# Patient Record
Sex: FEMALE | Race: WHITE | NOT HISPANIC OR LATINO | Employment: FULL TIME | URBAN - METROPOLITAN AREA
[De-identification: names, ages, dates, MRNs, and addresses within clinical notes are randomized per-mention and may not be internally consistent; named-entity substitution may affect disease eponyms.]

---

## 2021-12-02 NOTE — PROGRESS NOTES
"Chief Complaint  Consult (panniculectomy)    Subjective               HPI   Mary Del Real is a 46 y.o. female who presents to [facility]LASTIC [facility] RECON[facility] SURGERY as a consult from self  for   Chief Complaint   Patient presents with   • Consult     panniculectomy   .   Patient has had Gastric sleeve converted to  bypass. Starting weight is 398 lbs, current weight is 254 lbs, stable weight.  Patient has rash and moisture under abdominal fold. Treats rash with Nystatin powder and ointment . History of surgeries: gastric sleeve/bypass Patient does not children, and does not want to have children.   Wants to talk about a abdominoplasty  .    Allergies: Patient has no known allergies.  Allergies Reconciled.    Objective     /72 (BP Location: Left arm, Patient Position: Sitting)   Pulse 82   Temp 94.4 °F (34.7 °C) (Temporal)   Ht 165.1 cm (65\")   Wt 115 kg (254 lb)   SpO2 98%   BMI 42.27 kg/m²     Body mass index is 42.27 kg/m².    Physical Exam   • Excess skin above and below umbilicus with grade 5 panniculus. Significant moisture, hyperpigmentation, and irritation over lower abdominal fold, does not have palpable hernia, and no open areas.     • Lipodystrophy of abdomen, flanks and back.     Result Review :   The following data was reviewed by: Sterling Owens MD on 12/03/2021:    Procedures              Diagnoses and all orders for this visit:    1. Abdominal pannus (Primary)        Plan:  • I had a long discussion with the patient regarding her  panniculitis. I have explained to her  that I believe she would benefit from panniculectomy to address her excess hanging skin. I explained that this would involve a low transverse incision and would only address the excess skin below the umbilicus. Patient was told about the possible utilization of an additional vertical incision which is often used to further improve the abdominal contour.She  understands that this is not a cosmetic " body contouring procedure.  Scar position was discussed, including long transverse incision. We discussed recovery time which would include no heavy lifting for 4-6 weeks, drains, and abdominal binder. We discussed risks including but not limited to bleeding, infection, pain, wound healing problems, contour irregularities, blood clot, and anesthetic risk .  • Discussed r/b of liposuction including, bruising, bleeding, infection, numbness, scarring, pain, fat embolism, asymmetries, contour irregularities and anesthetic risk. Client has realistic expectations and would like to proceed.   • The patient would be an excellent candidate for abdominoplasty. Scar position was discussed, including low transverse incision and possibly utilization of a vertical incision to improve contour. Discussed anesthetic risk and risks including, but not limited to wound healing problems, scar, blood clot, fluid collection, infection. Patient understands no heavy lifting or working out for 6 weeks and importance of ambulating post op to prevent a blood clot.  • In case she opts for surgery, she would stay one night in the hospital and go home on the next day with drains and abdominal binder Photographs were obtained today. I will write to the insurance company to obtain insurance coverage for this procedure.    Specifically in her case, I would recommend panniculectomy with horizontal scar only. I explained to patient the risk and benefits as well as the high chance of complications such as blood clots and wound healing problems. I won't address the lateral rows and she will have bilateral dog ears. I will however reposition her umbilicus. I explained she will have a long scar, we will use drains and also abdominal binder. She will have a pre op shot of lovenox to decrease change of blood clot and spy to antecipate areas of fat necorsis.     CPT:   03870 panniculectomy  30231 Use of spy  14000-umbilical transposition   Or time: 3 hours  under general anesthesia - use of spy  lovenox in pre op only  Overnight stay        Follow Up     No follow-ups on file.    Patient was given instructions and counseling regarding her condition. Please see specific information pulled into the AVS if appropriate.     Sterling Owens MD  12/03/2021

## 2021-12-03 ENCOUNTER — OFFICE VISIT (OUTPATIENT)
Dept: PLASTIC SURGERY | Facility: CLINIC | Age: 46
End: 2021-12-03

## 2021-12-03 VITALS
SYSTOLIC BLOOD PRESSURE: 107 MMHG | HEART RATE: 82 BPM | OXYGEN SATURATION: 98 % | HEIGHT: 65 IN | WEIGHT: 254 LBS | DIASTOLIC BLOOD PRESSURE: 72 MMHG | TEMPERATURE: 94.4 F | BODY MASS INDEX: 42.32 KG/M2

## 2021-12-03 DIAGNOSIS — E65 ABDOMINAL PANNUS: Primary | ICD-10-CM

## 2021-12-03 PROCEDURE — 99204 OFFICE O/P NEW MOD 45 MIN: CPT | Performed by: SURGERY

## 2021-12-03 RX ORDER — ASCORBIC ACID 250 MG
250 TABLET ORAL DAILY
COMMUNITY

## 2021-12-03 RX ORDER — CYANOCOBALAMIN 1000 UG/ML
INJECTION, SOLUTION INTRAMUSCULAR; SUBCUTANEOUS
COMMUNITY
End: 2022-05-06

## 2021-12-03 RX ORDER — IBUPROFEN 200 MG
1 CAPSULE ORAL DAILY
COMMUNITY
End: 2022-05-06

## 2021-12-03 RX ORDER — SERTRALINE HYDROCHLORIDE 100 MG/1
TABLET, FILM COATED ORAL
COMMUNITY
Start: 2021-11-19

## 2021-12-03 RX ORDER — PHENDIMETRAZINE TARTRATE 35 MG/1
TABLET ORAL EVERY 8 HOURS SCHEDULED
COMMUNITY
End: 2022-05-06

## 2021-12-03 RX ORDER — OMEPRAZOLE 20 MG/1
20 CAPSULE, DELAYED RELEASE ORAL DAILY
COMMUNITY
Start: 2021-11-22 | End: 2022-05-20

## 2021-12-03 RX ORDER — FLUTICASONE PROPIONATE 50 MCG
1 SPRAY, SUSPENSION (ML) NASAL DAILY
COMMUNITY
Start: 2021-11-22

## 2021-12-03 RX ORDER — BIOTIN 10 MG
1 TABLET ORAL DAILY
COMMUNITY

## 2021-12-03 RX ORDER — BUSPIRONE HYDROCHLORIDE 15 MG/1
15 TABLET ORAL DAILY
COMMUNITY
Start: 2021-11-19

## 2021-12-03 RX ORDER — FERROUS SULFATE 325(65) MG
TABLET ORAL
COMMUNITY
Start: 2021-11-19

## 2021-12-03 RX ORDER — NYSTATIN 100000 [USP'U]/G
POWDER TOPICAL
COMMUNITY
Start: 2021-08-31

## 2021-12-08 ENCOUNTER — PREP FOR SURGERY (OUTPATIENT)
Dept: OTHER | Facility: HOSPITAL | Age: 46
End: 2021-12-08

## 2021-12-08 DIAGNOSIS — E65 ABDOMINAL PANNUS: Primary | ICD-10-CM

## 2021-12-08 RX ORDER — CLINDAMYCIN PHOSPHATE 900 MG/50ML
900 INJECTION INTRAVENOUS ONCE
Status: CANCELLED | OUTPATIENT
Start: 2021-12-08 | End: 2021-12-08

## 2022-01-11 DIAGNOSIS — Z01.818 PREOP EXAMINATION: Primary | ICD-10-CM

## 2022-01-28 ENCOUNTER — APPOINTMENT (OUTPATIENT)
Dept: PREADMISSION TESTING | Facility: HOSPITAL | Age: 47
End: 2022-01-28

## 2022-01-31 ENCOUNTER — APPOINTMENT (OUTPATIENT)
Dept: LAB | Facility: HOSPITAL | Age: 47
End: 2022-01-31

## 2022-04-05 ENCOUNTER — TELEPHONE (OUTPATIENT)
Dept: OBSTETRICS AND GYNECOLOGY | Facility: CLINIC | Age: 47
End: 2022-04-05

## 2022-04-05 NOTE — TELEPHONE ENCOUNTER
Patient called stating Plastic Surgeon has a procedure scheduled 5/24/22.  She is also a patient of Dr. Petty who previously discussed a hysterectomy due to an enlarged uterus and a failed ablation.  Patient has heavy menstrual periods and desires definitive treatment.  Patient inquiring if a hysterectomy is possible at the same time as her Plastics procedure.  Will discuss with Physicians available that day and call patient with that information.

## 2022-04-12 ENCOUNTER — TELEPHONE (OUTPATIENT)
Dept: OBSTETRICS AND GYNECOLOGY | Facility: CLINIC | Age: 47
End: 2022-04-12

## 2022-04-12 NOTE — TELEPHONE ENCOUNTER
Called patient and left message.  We will not be able to coordinate her hysterectomy with plastics procedure due to recent changes to schedules since Dr. Cheung currently out of the office for an extended period.

## 2022-05-06 ENCOUNTER — OFFICE VISIT (OUTPATIENT)
Dept: PLASTIC SURGERY | Facility: CLINIC | Age: 47
End: 2022-05-06

## 2022-05-06 VITALS
WEIGHT: 244 LBS | TEMPERATURE: 98.2 F | HEIGHT: 65 IN | OXYGEN SATURATION: 98 % | SYSTOLIC BLOOD PRESSURE: 124 MMHG | BODY MASS INDEX: 40.65 KG/M2 | HEART RATE: 88 BPM | DIASTOLIC BLOOD PRESSURE: 86 MMHG

## 2022-05-06 DIAGNOSIS — E65 ABDOMINAL PANNUS: ICD-10-CM

## 2022-05-06 DIAGNOSIS — M79.3 PANNICULITIS: ICD-10-CM

## 2022-05-06 DIAGNOSIS — Z01.818 PRE-OPERATIVE EXAMINATION: Primary | ICD-10-CM

## 2022-05-06 PROCEDURE — 99213 OFFICE O/P EST LOW 20 MIN: CPT | Performed by: NURSE PRACTITIONER

## 2022-05-06 RX ORDER — CEPHALEXIN 500 MG/1
500 CAPSULE ORAL 4 TIMES DAILY
Qty: 20 CAPSULE | Refills: 0 | Status: SHIPPED | OUTPATIENT
Start: 2022-05-06 | End: 2022-05-11

## 2022-05-06 RX ORDER — PROMETHAZINE HYDROCHLORIDE 25 MG/1
25 TABLET ORAL EVERY 6 HOURS PRN
Qty: 10 TABLET | Refills: 0 | Status: SHIPPED | OUTPATIENT
Start: 2022-05-06 | End: 2022-06-08 | Stop reason: SDUPTHER

## 2022-05-06 RX ORDER — OXYCODONE HYDROCHLORIDE AND ACETAMINOPHEN 5; 325 MG/1; MG/1
1 TABLET ORAL EVERY 6 HOURS PRN
Qty: 28 TABLET | Refills: 0 | Status: SHIPPED | OUTPATIENT
Start: 2022-05-06 | End: 2022-06-01 | Stop reason: SDUPTHER

## 2022-05-06 NOTE — PROGRESS NOTES
"Pre-op Exam (Panniculectomy )            History of Present Illness  Mary Del Real is a 47 y.o. female who presents to [facility] RECON[facility] SURGERY for Pre-Operative Examination for Panniculectomy with umbilical transposition.     She is scheduled for 05/24/2022 @ [facility].     Subjective        Patient has no known allergies.  Allergies Reconciled.    Review of Systems   All review of system has been reviewed and it  is negative except the ones note above.     Objective     /86 (BP Location: Left arm)   Pulse 88   Temp 98.2 °F (36.8 °C) (Temporal)   Ht 165.1 cm (65\")   Wt 111 kg (244 lb)   SpO2 98%   BMI 40.60 kg/m²     Body mass index is 40.6 kg/m².    Physical Exam      Excess skin above and below umbilicus with grade 3- panniculus. Significant moisture, hyperpigmentation, and irritation over lower abdominal fold, does not have palpable hernia, and no open areas.     Result Review :                Assessment and Plan      Diagnoses and all orders for this visit:    1. Pre-operative examination (Primary)  -     oxyCODONE-acetaminophen (Percocet) 5-325 MG per tablet; Take 1 tablet by mouth Every 6 (Six) Hours As Needed for Moderate Pain .  Dispense: 28 tablet; Refill: 0    2. Abdominal pannus  -     oxyCODONE-acetaminophen (Percocet) 5-325 MG per tablet; Take 1 tablet by mouth Every 6 (Six) Hours As Needed for Moderate Pain .  Dispense: 28 tablet; Refill: 0    3. Panniculitis  -     oxyCODONE-acetaminophen (Percocet) 5-325 MG per tablet; Take 1 tablet by mouth Every 6 (Six) Hours As Needed for Moderate Pain .  Dispense: 28 tablet; Refill: 0    Other orders  -     promethazine (PHENERGAN) 25 MG tablet; Take 1 tablet by mouth Every 6 (Six) Hours As Needed for Nausea or Vomiting.  Dispense: 10 tablet; Refill: 0  -     cephalexin (KEFLEX) 500 MG capsule; Take 1 capsule by mouth 4 (Four) Times a Day for 5 days.  Dispense: 20 capsule; Refill: 0        Plan:  • Given these options, " the patient has verbally expressed an understanding of the risks of surgery and finds these risks acceptable. We will proceed with surgery as soon as possible.  • Covid Immunization status: Vaccinated   • Medications sent to pharmacy  • Discussed a nicotine test will be preformed prior to surgery on all surgical patients and if positive, surgery will be canceled. Instructed to avoid second hand smoke and handling any product with nicotine before surgery and after. Nicotine is what causes vasoconstriction of the blood vessels and must be avoided to prevent complictions such as wound healing problems and infection. Patient verbalized understanding.     Follow Up     No follow-ups on file.    Patient was given instructions and counseling regarding her condition. Please see specific information pulled into the AVS if appropriate.     Larissa Valdovinos, SOURAV  05/06/2022

## 2022-05-06 NOTE — H&P (VIEW-ONLY)
"Pre-op Exam (Panniculectomy )            History of Present Illness  Mary Del Real is a 47 y.o. female who presents to [facility]LASTIC [facility] RECONSTRU[facility]IVE SURGERY for Pre-Operative Examination for Panniculectomy with umbilical transposition.     She is scheduled for 05/24/2022 @ [facility].     Subjective        Patient has no known allergies.  Allergies Reconciled.    Review of Systems   All review of system has been reviewed and it  is negative except the ones note above.     Objective     /86 (BP Location: Left arm)   Pulse 88   Temp 98.2 °F (36.8 °C) (Temporal)   Ht 165.1 cm (65\")   Wt 111 kg (244 lb)   SpO2 98%   BMI 40.60 kg/m²     Body mass index is 40.6 kg/m².    Physical Exam      Excess skin above and below umbilicus with grade 3- panniculus. Significant moisture, hyperpigmentation, and irritation over lower abdominal fold, does not have palpable hernia, and no open areas.     Result Review :                Assessment and Plan      Diagnoses and all orders for this visit:    1. Pre-operative examination (Primary)  -     oxyCODONE-acetaminophen (Percocet) 5-325 MG per tablet; Take 1 tablet by mouth Every 6 (Six) Hours As Needed for Moderate Pain .  Dispense: 28 tablet; Refill: 0    2. Abdominal pannus  -     oxyCODONE-acetaminophen (Percocet) 5-325 MG per tablet; Take 1 tablet by mouth Every 6 (Six) Hours As Needed for Moderate Pain .  Dispense: 28 tablet; Refill: 0    3. Panniculitis  -     oxyCODONE-acetaminophen (Percocet) 5-325 MG per tablet; Take 1 tablet by mouth Every 6 (Six) Hours As Needed for Moderate Pain .  Dispense: 28 tablet; Refill: 0    Other orders  -     promethazine (PHENERGAN) 25 MG tablet; Take 1 tablet by mouth Every 6 (Six) Hours As Needed for Nausea or Vomiting.  Dispense: 10 tablet; Refill: 0  -     cephalexin (KEFLEX) 500 MG capsule; Take 1 capsule by mouth 4 (Four) Times a Day for 5 days.  Dispense: 20 capsule; Refill: 0        Plan:  • Given these options, " the patient has verbally expressed an understanding of the risks of surgery and finds these risks acceptable. We will proceed with surgery as soon as possible.  • Covid Immunization status: Vaccinated   • Medications sent to pharmacy  • Discussed a nicotine test will be preformed prior to surgery on all surgical patients and if positive, surgery will be canceled. Instructed to avoid second hand smoke and handling any product with nicotine before surgery and after. Nicotine is what causes vasoconstriction of the blood vessels and must be avoided to prevent complictions such as wound healing problems and infection. Patient verbalized understanding.     Follow Up     No follow-ups on file.    Patient was given instructions and counseling regarding her condition. Please see specific information pulled into the AVS if appropriate.     Larissa Valdovinos, SOURAV  05/06/2022

## 2022-05-24 ENCOUNTER — ANESTHESIA EVENT (OUTPATIENT)
Dept: PERIOP | Facility: HOSPITAL | Age: 47
End: 2022-05-24

## 2022-05-24 ENCOUNTER — ANESTHESIA (OUTPATIENT)
Dept: PERIOP | Facility: HOSPITAL | Age: 47
End: 2022-05-24

## 2022-05-24 ENCOUNTER — HOSPITAL ENCOUNTER (OUTPATIENT)
Facility: HOSPITAL | Age: 47
Setting detail: HOSPITAL OUTPATIENT SURGERY
Discharge: HOME | End: 2022-05-24
Attending: SURGERY | Admitting: SURGERY

## 2022-05-24 VITALS
HEIGHT: 66 IN | WEIGHT: 239.2 LBS | OXYGEN SATURATION: 97 % | TEMPERATURE: 97.5 F | SYSTOLIC BLOOD PRESSURE: 100 MMHG | RESPIRATION RATE: 16 BRPM | DIASTOLIC BLOOD PRESSURE: 57 MMHG | BODY MASS INDEX: 38.44 KG/M2 | HEART RATE: 89 BPM

## 2022-05-24 DIAGNOSIS — D48.1 DESMOID TUMOR: Primary | ICD-10-CM

## 2022-05-24 DIAGNOSIS — D49.59 OVARIAN TUMOR: Primary | ICD-10-CM

## 2022-05-24 DIAGNOSIS — E65 ABDOMINAL PANNUS: ICD-10-CM

## 2022-05-24 DIAGNOSIS — R19.03 RIGHT LOWER QUADRANT ABDOMINAL MASS: Primary | ICD-10-CM

## 2022-05-24 LAB — B-HCG UR QL: NEGATIVE

## 2022-05-24 PROCEDURE — 0 HYDROMORPHONE 1 MG/ML SOLUTION: Performed by: NURSE ANESTHETIST, CERTIFIED REGISTERED

## 2022-05-24 PROCEDURE — 25010000002 ONDANSETRON PER 1 MG: Performed by: NURSE ANESTHETIST, CERTIFIED REGISTERED

## 2022-05-24 PROCEDURE — 81025 URINE PREGNANCY TEST: CPT | Performed by: STUDENT IN AN ORGANIZED HEALTH CARE EDUCATION/TRAINING PROGRAM

## 2022-05-24 PROCEDURE — 15830 EXC EXCESSIVE SKIN ABDOMEN: CPT | Performed by: SURGERY

## 2022-05-24 PROCEDURE — 25010000002 ENOXAPARIN PER 10 MG: Performed by: NURSE PRACTITIONER

## 2022-05-24 PROCEDURE — 25010000002 HYDROMORPHONE 1 MG/ML SOLUTION: Performed by: ANESTHESIOLOGY

## 2022-05-24 PROCEDURE — 25010000002 FENTANYL CITRATE (PF) 50 MCG/ML SOLUTION: Performed by: NURSE ANESTHETIST, CERTIFIED REGISTERED

## 2022-05-24 PROCEDURE — 25010000002 MIDAZOLAM PER 1 MG: Performed by: STUDENT IN AN ORGANIZED HEALTH CARE EDUCATION/TRAINING PROGRAM

## 2022-05-24 PROCEDURE — 93005 ELECTROCARDIOGRAM TRACING: CPT | Performed by: STUDENT IN AN ORGANIZED HEALTH CARE EDUCATION/TRAINING PROGRAM

## 2022-05-24 PROCEDURE — 25010000002 PROPOFOL 10 MG/ML EMULSION: Performed by: NURSE ANESTHETIST, CERTIFIED REGISTERED

## 2022-05-24 PROCEDURE — 25010000002 DEXAMETHASONE PER 1 MG: Performed by: NURSE ANESTHETIST, CERTIFIED REGISTERED

## 2022-05-24 PROCEDURE — 25010000002 ONDANSETRON PER 1 MG: Performed by: ANESTHESIOLOGY

## 2022-05-24 PROCEDURE — 25010000002 PROCHLORPERAZINE 10 MG/2ML SOLUTION: Performed by: ANESTHESIOLOGY

## 2022-05-24 RX ORDER — KETAMINE HYDROCHLORIDE 50 MG/ML
INJECTION, SOLUTION, CONCENTRATE INTRAMUSCULAR; INTRAVENOUS AS NEEDED
Status: DISCONTINUED | OUTPATIENT
Start: 2022-05-24 | End: 2022-05-24 | Stop reason: SURG

## 2022-05-24 RX ORDER — ONDANSETRON 2 MG/ML
4 INJECTION INTRAMUSCULAR; INTRAVENOUS ONCE AS NEEDED
Status: DISCONTINUED | OUTPATIENT
Start: 2022-05-24 | End: 2022-05-24 | Stop reason: HOSPADM

## 2022-05-24 RX ORDER — GLYCOPYRROLATE 0.2 MG/ML
0.2 INJECTION INTRAMUSCULAR; INTRAVENOUS
Status: COMPLETED | OUTPATIENT
Start: 2022-05-24 | End: 2022-05-24

## 2022-05-24 RX ORDER — FENTANYL CITRATE 50 UG/ML
INJECTION, SOLUTION INTRAMUSCULAR; INTRAVENOUS AS NEEDED
Status: DISCONTINUED | OUTPATIENT
Start: 2022-05-24 | End: 2022-05-24 | Stop reason: SURG

## 2022-05-24 RX ORDER — DEXMEDETOMIDINE HYDROCHLORIDE 100 UG/ML
INJECTION, SOLUTION INTRAVENOUS AS NEEDED
Status: DISCONTINUED | OUTPATIENT
Start: 2022-05-24 | End: 2022-05-24 | Stop reason: SURG

## 2022-05-24 RX ORDER — SODIUM CHLORIDE, SODIUM LACTATE, POTASSIUM CHLORIDE, CALCIUM CHLORIDE 600; 310; 30; 20 MG/100ML; MG/100ML; MG/100ML; MG/100ML
9 INJECTION, SOLUTION INTRAVENOUS CONTINUOUS PRN
Status: DISCONTINUED | OUTPATIENT
Start: 2022-05-24 | End: 2022-05-24 | Stop reason: HOSPADM

## 2022-05-24 RX ORDER — ACETAMINOPHEN 500 MG
1000 TABLET ORAL ONCE
Status: COMPLETED | OUTPATIENT
Start: 2022-05-24 | End: 2022-05-24

## 2022-05-24 RX ORDER — DEXAMETHASONE SODIUM PHOSPHATE 4 MG/ML
INJECTION, SOLUTION INTRA-ARTICULAR; INTRALESIONAL; INTRAMUSCULAR; INTRAVENOUS; SOFT TISSUE AS NEEDED
Status: DISCONTINUED | OUTPATIENT
Start: 2022-05-24 | End: 2022-05-24 | Stop reason: SURG

## 2022-05-24 RX ORDER — OXYCODONE HYDROCHLORIDE 5 MG/1
5 TABLET ORAL ONCE AS NEEDED
Status: DISCONTINUED | OUTPATIENT
Start: 2022-05-24 | End: 2022-05-24 | Stop reason: HOSPADM

## 2022-05-24 RX ORDER — MEPERIDINE HYDROCHLORIDE 25 MG/ML
12.5 INJECTION INTRAMUSCULAR; INTRAVENOUS; SUBCUTANEOUS
Status: DISCONTINUED | OUTPATIENT
Start: 2022-05-24 | End: 2022-05-24 | Stop reason: HOSPADM

## 2022-05-24 RX ORDER — PROCHLORPERAZINE EDISYLATE 5 MG/ML
10 INJECTION INTRAMUSCULAR; INTRAVENOUS ONCE
Status: COMPLETED | OUTPATIENT
Start: 2022-05-24 | End: 2022-05-24

## 2022-05-24 RX ORDER — PROPOFOL 10 MG/ML
VIAL (ML) INTRAVENOUS AS NEEDED
Status: DISCONTINUED | OUTPATIENT
Start: 2022-05-24 | End: 2022-05-24 | Stop reason: SURG

## 2022-05-24 RX ORDER — ONDANSETRON 2 MG/ML
INJECTION INTRAMUSCULAR; INTRAVENOUS AS NEEDED
Status: DISCONTINUED | OUTPATIENT
Start: 2022-05-24 | End: 2022-05-24 | Stop reason: SURG

## 2022-05-24 RX ORDER — ROCURONIUM BROMIDE 10 MG/ML
INJECTION, SOLUTION INTRAVENOUS AS NEEDED
Status: DISCONTINUED | OUTPATIENT
Start: 2022-05-24 | End: 2022-05-24 | Stop reason: SURG

## 2022-05-24 RX ORDER — ENOXAPARIN SODIUM 100 MG/ML
40 INJECTION SUBCUTANEOUS
Status: COMPLETED | OUTPATIENT
Start: 2022-05-24 | End: 2022-05-24

## 2022-05-24 RX ORDER — GINSENG 100 MG
CAPSULE ORAL AS NEEDED
Status: DISCONTINUED | OUTPATIENT
Start: 2022-05-24 | End: 2022-05-24 | Stop reason: HOSPADM

## 2022-05-24 RX ORDER — PHENYLEPHRINE HCL IN 0.9% NACL 1 MG/10 ML
SYRINGE (ML) INTRAVENOUS AS NEEDED
Status: DISCONTINUED | OUTPATIENT
Start: 2022-05-24 | End: 2022-05-24 | Stop reason: SURG

## 2022-05-24 RX ORDER — LIDOCAINE HYDROCHLORIDE 20 MG/ML
INJECTION, SOLUTION EPIDURAL; INFILTRATION; INTRACAUDAL; PERINEURAL AS NEEDED
Status: DISCONTINUED | OUTPATIENT
Start: 2022-05-24 | End: 2022-05-24 | Stop reason: SURG

## 2022-05-24 RX ORDER — SCOLOPAMINE TRANSDERMAL SYSTEM 1 MG/1
1 PATCH, EXTENDED RELEASE TRANSDERMAL ONCE
Status: DISCONTINUED | OUTPATIENT
Start: 2022-05-24 | End: 2022-05-24 | Stop reason: HOSPADM

## 2022-05-24 RX ORDER — CLINDAMYCIN PHOSPHATE 900 MG/50ML
900 INJECTION INTRAVENOUS ONCE
Status: COMPLETED | OUTPATIENT
Start: 2022-05-24 | End: 2022-05-24

## 2022-05-24 RX ORDER — MIDAZOLAM HYDROCHLORIDE 1 MG/ML
2 INJECTION INTRAMUSCULAR; INTRAVENOUS ONCE
Status: COMPLETED | OUTPATIENT
Start: 2022-05-24 | End: 2022-05-24

## 2022-05-24 RX ADMIN — DEXMEDETOMIDINE HYDROCHLORIDE 10 MCG: 100 INJECTION, SOLUTION, CONCENTRATE INTRAVENOUS at 14:36

## 2022-05-24 RX ADMIN — PROCHLORPERAZINE EDISYLATE 10 MG: 5 INJECTION INTRAMUSCULAR; INTRAVENOUS at 17:21

## 2022-05-24 RX ADMIN — LIDOCAINE HYDROCHLORIDE 100 MG: 20 INJECTION, SOLUTION EPIDURAL; INFILTRATION; INTRACAUDAL; PERINEURAL at 15:20

## 2022-05-24 RX ADMIN — DEXMEDETOMIDINE HYDROCHLORIDE 20 MCG: 100 INJECTION, SOLUTION, CONCENTRATE INTRAVENOUS at 13:59

## 2022-05-24 RX ADMIN — ROCURONIUM BROMIDE 50 MG: 10 INJECTION INTRAVENOUS at 13:22

## 2022-05-24 RX ADMIN — HYDROMORPHONE HYDROCHLORIDE 0.5 MG: 1 INJECTION, SOLUTION INTRAMUSCULAR; INTRAVENOUS; SUBCUTANEOUS at 15:41

## 2022-05-24 RX ADMIN — ONDANSETRON 4 MG: 2 INJECTION INTRAMUSCULAR; INTRAVENOUS at 16:42

## 2022-05-24 RX ADMIN — ONDANSETRON 4 MG: 2 INJECTION INTRAMUSCULAR; INTRAVENOUS at 13:33

## 2022-05-24 RX ADMIN — SODIUM CHLORIDE, POTASSIUM CHLORIDE, SODIUM LACTATE AND CALCIUM CHLORIDE: 600; 310; 30; 20 INJECTION, SOLUTION INTRAVENOUS at 16:12

## 2022-05-24 RX ADMIN — Medication 50 MCG: at 13:51

## 2022-05-24 RX ADMIN — DEXMEDETOMIDINE HYDROCHLORIDE 10 MCG: 100 INJECTION, SOLUTION, CONCENTRATE INTRAVENOUS at 15:33

## 2022-05-24 RX ADMIN — GLYCOPYRROLATE 0.2 MG: 0.2 INJECTION INTRAMUSCULAR; INTRAVENOUS at 13:10

## 2022-05-24 RX ADMIN — ENOXAPARIN SODIUM 40 MG: 100 INJECTION SUBCUTANEOUS at 12:59

## 2022-05-24 RX ADMIN — KETAMINE HYDROCHLORIDE 20 MG: 50 INJECTION, SOLUTION INTRAMUSCULAR; INTRAVENOUS at 13:29

## 2022-05-24 RX ADMIN — KETAMINE HYDROCHLORIDE 30 MG: 50 INJECTION, SOLUTION INTRAMUSCULAR; INTRAVENOUS at 13:37

## 2022-05-24 RX ADMIN — PROPOFOL 200 MG: 10 INJECTION, EMULSION INTRAVENOUS at 13:22

## 2022-05-24 RX ADMIN — MIDAZOLAM HYDROCHLORIDE 2 MG: 1 INJECTION, SOLUTION INTRAMUSCULAR; INTRAVENOUS at 13:10

## 2022-05-24 RX ADMIN — SCOPALAMINE 1 PATCH: 1 PATCH, EXTENDED RELEASE TRANSDERMAL at 12:59

## 2022-05-24 RX ADMIN — HYDROMORPHONE HYDROCHLORIDE 0.5 MG: 1 INJECTION, SOLUTION INTRAMUSCULAR; INTRAVENOUS; SUBCUTANEOUS at 15:34

## 2022-05-24 RX ADMIN — SODIUM CHLORIDE, POTASSIUM CHLORIDE, SODIUM LACTATE AND CALCIUM CHLORIDE 9 ML/HR: 600; 310; 30; 20 INJECTION, SOLUTION INTRAVENOUS at 13:11

## 2022-05-24 RX ADMIN — OXYCODONE HYDROCHLORIDE 5 MG: 5 TABLET ORAL at 16:41

## 2022-05-24 RX ADMIN — SODIUM CHLORIDE, POTASSIUM CHLORIDE, SODIUM LACTATE AND CALCIUM CHLORIDE: 600; 310; 30; 20 INJECTION, SOLUTION INTRAVENOUS at 14:01

## 2022-05-24 RX ADMIN — ROCURONIUM BROMIDE 50 MG: 10 INJECTION INTRAVENOUS at 13:48

## 2022-05-24 RX ADMIN — DEXAMETHASONE SODIUM PHOSPHATE 4 MG: 4 INJECTION, SOLUTION INTRA-ARTICULAR; INTRALESIONAL; INTRAMUSCULAR; INTRAVENOUS; SOFT TISSUE at 15:38

## 2022-05-24 RX ADMIN — ACETAMINOPHEN 1000 MG: 500 TABLET ORAL at 12:59

## 2022-05-24 RX ADMIN — LIDOCAINE HYDROCHLORIDE 100 MG: 20 INJECTION, SOLUTION EPIDURAL; INFILTRATION; INTRACAUDAL; PERINEURAL at 13:22

## 2022-05-24 RX ADMIN — DEXMEDETOMIDINE HYDROCHLORIDE 20 MCG: 100 INJECTION, SOLUTION, CONCENTRATE INTRAVENOUS at 14:26

## 2022-05-24 RX ADMIN — FENTANYL CITRATE 100 MCG: 50 INJECTION, SOLUTION INTRAMUSCULAR; INTRAVENOUS at 13:21

## 2022-05-24 RX ADMIN — ONDANSETRON 4 MG: 2 INJECTION INTRAMUSCULAR; INTRAVENOUS at 15:38

## 2022-05-24 RX ADMIN — SUGAMMADEX 200 MG: 100 INJECTION, SOLUTION INTRAVENOUS at 16:02

## 2022-05-24 RX ADMIN — HYDROMORPHONE HYDROCHLORIDE 0.5 MG: 1 INJECTION, SOLUTION INTRAMUSCULAR; INTRAVENOUS; SUBCUTANEOUS at 16:42

## 2022-05-24 RX ADMIN — DEXAMETHASONE SODIUM PHOSPHATE 4 MG: 4 INJECTION, SOLUTION INTRA-ARTICULAR; INTRALESIONAL; INTRAMUSCULAR; INTRAVENOUS; SOFT TISSUE at 13:33

## 2022-05-24 RX ADMIN — ROCURONIUM BROMIDE 50 MG: 10 INJECTION INTRAVENOUS at 14:35

## 2022-05-24 RX ADMIN — CLINDAMYCIN IN 5 PERCENT DEXTROSE 900 MG: 18 INJECTION, SOLUTION INTRAVENOUS at 13:20

## 2022-05-24 RX ADMIN — DEXMEDETOMIDINE HYDROCHLORIDE 10 MCG: 100 INJECTION, SOLUTION, CONCENTRATE INTRAVENOUS at 15:17

## 2022-05-24 NOTE — INTERVAL H&P NOTE
H&P reviewed. The patient was examined and there are no changes to the H&P.    Patient is not tachycardic  Respirations are non elaborated  There were no vitals filed for this visit.  Risks and benefits reminded to patient who agrees to proceed

## 2022-05-24 NOTE — ANESTHESIA PREPROCEDURE EVALUATION
Anesthesia Evaluation     Patient summary reviewed and Nursing notes reviewed   history of anesthetic complications: PONV prolonged sedation  NPO Solid Status: > 8 hours  NPO Liquid Status: > 2 hours           Airway   Mallampati: I  TM distance: >3 FB  No difficulty expected  Dental - normal exam     Pulmonary - normal exam   (+) sleep apnea,   Cardiovascular - normal exam  Exercise tolerance: good (4-7 METS)    (+) hypertension,       Neuro/Psych  (+) psychiatric history Anxiety and Depression,    GI/Hepatic/Renal/Endo    (+) obesity, morbid obesity, GERD,      Musculoskeletal (-) negative ROS    Abdominal  - normal exam   Substance History - negative use     OB/GYN negative ob/gyn ROS         Other   blood dyscrasia anemia,     ROS/Med Hx Other:                   Anesthesia Plan    ASA 2     general   (Patient understands anesthesia not responsible for dental damage.)  intravenous induction     Anesthetic plan, all risks, benefits, and alternatives have been provided, discussed and informed consent has been obtained with: patient.    Plan discussed with CRNA.        CODE STATUS:

## 2022-05-24 NOTE — ADDENDUM NOTE
Addendum  created 05/24/22 1716 by Dallas De Luna MD    Order list changed, Pharmacy for encounter modified

## 2022-05-24 NOTE — OP NOTE
Plastic Surgery Op Note    SYED PRYOR PANNICULECTOMY WITH CLOSURE WITH TWO FASCIOCUTANEOUS FLAPS AND USE OF SPY    Mary Del Real  5/24/2022    Pre-op Diagnosis:   Abdominal pannus [E65]    Post-Op Diagnosis Codes:     * Abdominal pannus [E65]    Anesthesia: General    Staff:   Circulator: Ashanti Alvarez RN; Yvette Price RN  Scrub Person: Radha Ruffin  Assistant: Malinda Lr    Surgeon: Dr Owens    Estimated Blood Loss: 50 mL    Specimens:   Order Name Source Comment Collection Info Order Time   PREGNANCY, URINE Urine, Clean Catch  Collected By: Carol Liu RN 5/24/2022 12:21 PM     Release to patient   Immediate              Drains:   Closed/Suction Drain 2 Inferior;Midline Abdomen Bulb 15 Fr. (Active)       Urethral Catheter 16 Fr. (Active)       Findings:     Panniculectomy weight: 4.9KG  Visible and palpable right ovarian cyst - see pictures          Complications: none       Date: 5/24/2022  Time: 16:11 EDT  After risks and benefits were discussed with patient and informed consent was signed, patient was taken to the procedure room and positioned supine.  The midline was marked and an horizontal incision was marked 7 cm from the superior border of the labia. Then the skin was excised and the subcutaneous was divided with Bovie until the anterior fascia was visualized. The dissection was carried out superiorly and laterally with care to not devitalize the flaps. Once the dissection reached the umbilical stalk, the flap was divided at midline and the umbilicus was  from the abdominal wall. The dissection was carried out cranially and medially until the xyphoid process was palpated. Hemostasis was achieved. A large approximately 20 cm diameter right intra abdominal mass was observed. It corresponded to the known right ovarian desmoid cyst.  Pictures in media. Then, I proceed with the vertical resection of skin at the midline. That was approximated with advancement of two  fasciocutaneous flaps of approximately 30x 10 cm each. Then the subcutaneous was irrigated, two drains were placed and the  Ronnell umbilicus was made with 3 interrupted sutures of 2-0 nylon attached to the abdominal wall.  The remaining of the abdominal  incision was then closed in layers  with -3-0 vicryl in the heladio  followed by subdermal insorb and  3-0 Vicryl. Dressings were placed over the skin incisions. An  appropriately sized compression garment was then applied over the torso.   There were no complications and patient tolerated procedure well.        Sterling Owens MD  05/24/22  16:11 EDT

## 2022-05-24 NOTE — DISCHARGE INSTRUCTIONS
Ok for regular diet  Do not drive for next 2 weeks  Ok to shower but be aware you are a fall risk so have someone with you or place a chair in the shower. It is ok to wet the abdomen. Wash the drain site with water and liquid soap everyday. No sponge or cloths. Wash the drain site before other parts of the body  Strip drains q 8 hours and record drain output daily. Wash hands or wear gloves when manipulating drains.  Do not exercise for the next 2 weeks.  Sleep in an upright position with the abdomen flexed   Wear abdominal binder all times        DISCHARGE INSTRUCTIONS  SURGICAL / AMBULATORY  PROCEDURES      For your surgery you had:  General anesthesia (you may have a sore throat for the first 24 hours)  IV sedation.  Local anesthesia  Monitored anesthesia Care  You received a medicated patch for nausea prevention today (behind your ear). It is recommended that you remove it 24-48 hours post-operatively. It must be removed within 72 hours.   You may experience dizziness, drowsiness, or light-headedness for several hours following surgery/procedure.  Do not stay alone today or tonight.  Limit your activity for 24 hours.  Resume your diet slowly.  Follow whatever special dietary instructions you may have been given by your doctor.  You should not drive or operate machinery, drink alcohol, or sign legally binding documents for 24 hours or while you are taking pain medication.  Last dose of pain medication was given at:   .  NOTIFY YOUR DOCTOR IF YOU EXPERIENCE ANY OF THE FOLLOWING:  Temperature greater than 101 degrees Fahrenheit  Shaking Chills  Redness or excessive drainage from incision  Nausea, vomiting and/or pain that is not controlled by prescribed medications  Increase in bleeding or bleeding that is excessive  Unable to urinate in 6 hours after surgery  If unable to reach your doctor, please go to the closest Emergency Room    You may remove Band-Aid/dressing tomorrow.  You may shower tomorrow .  Medications  per physician instructions as indicated on Discharge Medication Information Sheet.  You should see   for follow-up care   on   .  Phone number:       SPECIAL INSTRUCTIONS:

## 2022-05-24 NOTE — ANESTHESIA POSTPROCEDURE EVALUATION
Patient: Mary Del Real    Procedure Summary     Date: 05/24/22 Room / Location: [facility] OR  / [facility]    Anesthesia Start: 1320 Anesthesia Stop: 1619    Procedure: PANNICULECTOMY WITH UMBILICAL TRANSPOSITION AND USE OF SPY (N/A Abdomen) Diagnosis:       Abdominal pannus      (Abdominal pannus [E65])    Surgeons: Sterling Owens MD Provider: Lakeisha Gaston DO    Anesthesia Type: general ASA Status: 2          Anesthesia Type: general    Vitals  Vitals Value Taken Time   /66 05/24/22 1631   Temp 36.3 °C (97.4 °F) 05/24/22 1609   Pulse 89 05/24/22 1639   Resp 16 05/24/22 1630   SpO2 100 % 05/24/22 1639   Vitals shown include unvalidated device data.        Post Anesthesia Care and Evaluation    Patient location during evaluation: bedside  Patient participation: complete - patient participated  Level of consciousness: awake  Pain management: adequate  Airway patency: patent  Anesthetic complications: No anesthetic complications  PONV Status: none  Cardiovascular status: acceptable  Respiratory status: acceptable  Hydration status: acceptable    Comments: An Anesthesiologist personally participated in the most demanding procedures (including induction and emergence if applicable) in the anesthesia plan, monitored the course of anesthesia administration at frequent intervals and remained physically present and available for immediate diagnosis and treatment of emergencies.

## 2022-06-01 ENCOUNTER — OFFICE VISIT (OUTPATIENT)
Dept: PLASTIC SURGERY | Facility: CLINIC | Age: 47
End: 2022-06-01

## 2022-06-01 VITALS
OXYGEN SATURATION: 99 % | HEART RATE: 98 BPM | SYSTOLIC BLOOD PRESSURE: 103 MMHG | DIASTOLIC BLOOD PRESSURE: 66 MMHG | TEMPERATURE: 99.2 F

## 2022-06-01 DIAGNOSIS — Z09 POSTOPERATIVE FOLLOW-UP: Primary | ICD-10-CM

## 2022-06-01 DIAGNOSIS — M79.3 PANNICULITIS: ICD-10-CM

## 2022-06-01 DIAGNOSIS — E65 ABDOMINAL PANNUS: ICD-10-CM

## 2022-06-01 PROBLEM — F32.A DEPRESSIVE DISORDER: Status: ACTIVE | Noted: 2021-08-14

## 2022-06-01 PROBLEM — D21.9 FIBROIDS: Status: ACTIVE | Noted: 2018-10-26

## 2022-06-01 PROBLEM — E66.01 MORBID OBESITY: Status: ACTIVE | Noted: 2022-06-01

## 2022-06-01 PROBLEM — E66.9 OBESITY: Status: ACTIVE | Noted: 2021-08-14

## 2022-06-01 PROBLEM — E66.9 OBESITY (BMI 30.0-34.9): Status: ACTIVE | Noted: 2022-06-01

## 2022-06-01 PROBLEM — E66.811 OBESITY (BMI 30.0-34.9): Status: ACTIVE | Noted: 2022-06-01

## 2022-06-01 PROBLEM — N92.1 MENOMETRORRHAGIA: Status: ACTIVE | Noted: 2018-10-26

## 2022-06-01 PROBLEM — D50.9 IRON DEFICIENCY ANEMIA: Status: ACTIVE | Noted: 2021-08-14

## 2022-06-01 PROCEDURE — 99024 POSTOP FOLLOW-UP VISIT: CPT | Performed by: NURSE PRACTITIONER

## 2022-06-01 RX ORDER — OXYCODONE HYDROCHLORIDE AND ACETAMINOPHEN 5; 325 MG/1; MG/1
1 TABLET ORAL EVERY 6 HOURS PRN
Qty: 28 TABLET | Refills: 0 | Status: SHIPPED | OUTPATIENT
Start: 2022-06-01 | End: 2022-06-20

## 2022-06-05 LAB — QT INTERVAL: 372 MS

## 2022-06-06 ENCOUNTER — TELEPHONE (OUTPATIENT)
Dept: GYNECOLOGIC ONCOLOGY | Facility: CLINIC | Age: 47
End: 2022-06-06

## 2022-06-06 NOTE — TELEPHONE ENCOUNTER
Provider: DR MOLINA  Caller: JABARI    Reason for Call: JABARI HAD A NEW PATIENT APPT SCHEDULED FOR 6-13, SHE CALLED TO R/S TO 8-3.  WANTING TO MAKE SURE JABARI CAN WAIT THAT LONG TO BE SEEN, SHE STATED SHE JUST HAD SURGERY ON HER STOMACH AND WANTED TO PUSH OUT HER APPT.    PLEASE ADVISE

## 2022-06-06 NOTE — TELEPHONE ENCOUNTER
Called pt - unable to leave VM due to it being full. Sent pt MyChart message and told her to call the office, pt cannot wait until August to be seen.

## 2022-06-08 ENCOUNTER — OFFICE VISIT (OUTPATIENT)
Dept: PLASTIC SURGERY | Facility: CLINIC | Age: 47
End: 2022-06-08

## 2022-06-08 ENCOUNTER — OFFICE VISIT (OUTPATIENT)
Dept: GYNECOLOGIC ONCOLOGY | Facility: CLINIC | Age: 47
End: 2022-06-08

## 2022-06-08 VITALS
BODY MASS INDEX: 37.32 KG/M2 | DIASTOLIC BLOOD PRESSURE: 82 MMHG | RESPIRATION RATE: 20 BRPM | HEIGHT: 66 IN | TEMPERATURE: 98.2 F | HEART RATE: 95 BPM | SYSTOLIC BLOOD PRESSURE: 124 MMHG | OXYGEN SATURATION: 99 % | WEIGHT: 232.2 LBS

## 2022-06-08 VITALS
HEIGHT: 66 IN | DIASTOLIC BLOOD PRESSURE: 79 MMHG | WEIGHT: 234.2 LBS | HEART RATE: 82 BPM | OXYGEN SATURATION: 100 % | BODY MASS INDEX: 37.64 KG/M2 | SYSTOLIC BLOOD PRESSURE: 125 MMHG | TEMPERATURE: 98.9 F

## 2022-06-08 DIAGNOSIS — Z09 POSTOPERATIVE FOLLOW-UP: Primary | ICD-10-CM

## 2022-06-08 DIAGNOSIS — N83.8 OVARIAN MASS: Primary | ICD-10-CM

## 2022-06-08 PROCEDURE — 99024 POSTOP FOLLOW-UP VISIT: CPT | Performed by: NURSE PRACTITIONER

## 2022-06-08 PROCEDURE — 99203 OFFICE O/P NEW LOW 30 MIN: CPT | Performed by: OBSTETRICS & GYNECOLOGY

## 2022-06-08 RX ORDER — PROMETHAZINE HYDROCHLORIDE 25 MG/1
25 TABLET ORAL EVERY 6 HOURS PRN
Qty: 10 TABLET | Refills: 0 | Status: SHIPPED | OUTPATIENT
Start: 2022-06-08 | End: 2022-06-20

## 2022-06-08 NOTE — PROGRESS NOTES
Age: 47 y.o.  Sex: female  :  1975  MRN: [id number]       REFERRING PHYSICIAN: SOURAV Martinez  DATE OF VISIT: 2022        Mary Del Real [place] [facility] for evaluation and management of large 20 cm mass that was discovered at time of panniculectomy. CT scan was ordered, no results yet. Has complaints of bloating and constipation. Pt states she has known about this dermoid and was planned to have removal with Wisam Lange). She also went to have an ablation due to AUB, she has also had an EMBx.   Today she is recent post op with drains in place, healing well. Has her first post op appt today.       Oncology/Hematology History    No history exists.       Past Medical History:  Past Medical History:   Diagnosis Date   • Anemia    • Anemia    • Anxiety 20 years   • GERD (gastroesophageal reflux disease)    • Hypertension     resolved since weight loss   • Seasonal allergies        Past Surgical History:  Past Surgical History:   Procedure Laterality Date   • FRACTURE SURGERY      arm surgery   • GALLBLADDER SURGERY     • GASTRIC BYPASS     • GASTRIC SLEEVE LAPAROSCOPIC     • PANNICULECTOMY N/A 2022    Procedure: PANNICULECTOMY WITH UMBILICAL TRANSPOSITION AND USE OF SPY;  Surgeon: Sterling Owens MD;  [place] [facility] [place] [facility] [place]  Service: Plastics;  Laterality: N/A;        MEDICATIONS:    Current Outpatient Medications:   •  ascorbic acid (VITAMIN C) 250 MG tablet, Take 250 mg by mouth Daily., Disp: , Rfl:   •  Biotin 10 MG tablet, Take 1 tablet by mouth Daily., Disp: , Rfl:   •  busPIRone (BUSPAR) 15 MG tablet, Take 15 mg by mouth Daily., Disp: , Rfl:   •  Calcium-Phosphorus-Vitamin D 250-100-500 MG-MG-UNIT chewable tablet, Chew., Disp: , Rfl:   •  Cyanocobalamin 1000 MCG sublingual tablet, Place 1,000 mcg under the tongue Daily., Disp: , Rfl:   •  ferrous sulfate 325 (65 FE) MG tablet, FeroSul 325 mg (65 mg iron) tablet  TAKE 1 TABLET BY MOUTH  "TWICE DAILY, Disp: , Rfl:   •  fluticasone (FLONASE) 50 MCG/ACT nasal spray, 1 spray by Each Nare route Daily. Shake before using., Disp: , Rfl:   •  nystatin (MYCOSTATIN) 464984 UNIT/GM powder, Nystop 100,000 unit/gram topical powder  APPLY TO THE AFFECTED AREA TWICE DAILY., Disp: , Rfl:   •  oxyCODONE-acetaminophen (Percocet) 5-325 MG per tablet, Take 1 tablet by mouth Every 6 (Six) Hours As Needed for Moderate Pain ., Disp: 28 tablet, Rfl: 0  •  ProAir  (90 Base) MCG/ACT inhaler, INHALE 2 PUFFS INTO THE LUNGS EVERY 4 HOURS AS NEEDED FOR WHEEZING OR SHORTNESS OF AIR, Disp: , Rfl:   •  promethazine (PHENERGAN) 25 MG tablet, Take 1 tablet by mouth Every 6 (Six) Hours As Needed for Nausea or Vomiting., Disp: 10 tablet, Rfl: 0  •  sertraline (ZOLOFT) 100 MG tablet, sertraline 100 mg tablet  TAKE 1 TABLET BY MOUTH DAILY, Disp: , Rfl:     ALLERGIES:  No Known Allergies      ROS:  CONSTITUTIONAL:  Denies fever or chills.   NEUROLOGIC:  Denies headache, focal weakness or sensory changes.   EYES:  Denies change in visual acuity.  HEENT:  Denies nasal congestion or sore throat.   RESPIRATORY:  Denies cough or shortness of breath.   CARDIOVASCULAR:  Denies chest pain or edema.   GI:  Denies abdominal pain, nausea, vomiting, bloody stools or diarrhea.   :  Denies dysuria, leaking or incontinence.  MUSCULOSKELETAL:  Denies back pain or joint pain.   INTEGUMENT:  Denies rash.   ENDOCRINE:  Denies polyuria or polydipsia.   LYMPHATIC:  Denies swollen glands or lymphedema.   PSYCHIATRIC:  Denies depression or anxiety.    PHYSICAL EXAM:  Vitals:    06/08/22 1111   BP: 124/82   Pulse: 95   Resp: 20   Temp: 98.2 °F (36.8 °C)   TempSrc: Oral   SpO2: 99%   Weight: 105 kg (232 lb 3.2 oz)   Height: 168 cm (66.14\")  Comment: new patient height   PainSc:   6   PainLoc: Abdomen     Body mass index is 37.32 kg/m².  No flowsheet data found.  PHQ-9 Total Score:         GEN: alert and oriented x 3, normal affect, well nourished and " hydrated.  CARDIO: regular rate and rhythm.  PULM: Lungs CTA b.l, no RRW   ABD: covered with ABD binder , drains x 2 in place with  SS fluid   GYN: defer today  EXT: No petechiae, bruising, rash, candida. No CCE.       Result Review :  The pertinent labs, images, and/or pathology as noted in the oncology history were reviewed independently and discussed with the patient.   Jaja Alexander,    06/08/2022    [facility] LABS:   WBC   Date Value Ref Range Status   11/19/2021 6.39 4.5 - 11.0 10*3/uL Final     RBC   Date Value Ref Range Status   11/19/2021 5.06 4.0 - 5.2 10*6/uL Final     Hemoglobin   Date Value Ref Range Status   11/19/2021 10.6 (L) 12.0 - 16.0 g/dL Final     Hematocrit   Date Value Ref Range Status   11/19/2021 37.8 36.0 - 46.0 % Final     Platelets   Date Value Ref Range Status   11/19/2021 149 140 - 440 10*3/uL Final     No results found for:     [facility] IMAGING:  No radiology results for the last 90 days.          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             Jaja Alexander D.O  6/8/2022    Gynecologic Oncology   [address] [place] [address]  [phone] office

## 2022-06-08 NOTE — PROGRESS NOTES
".prsChief Complaint  Post-op Follow-up (2wk post op/drain pull ? )    Subjective          History of Present Illness  Mary Del Real is a 47 y.o. female who presents to [facility] for Postoperative Follow-Up of Panniculectomy and Umbilical Transposition    She is 2 wks post op, she states she has had some nausea lately. She still has both drains and states they have been putting out Left < 25 X 3 days Right < 30 x 3 days. She has been wearing her binder which has been helping.     Suture intact over small area of dehiscence.     Allergies: Patient has no known allergies.  Allergies Reconciled.    Review of Systems   All review of system has been reviewed and it  is negative except the ones note above    Objective     /79   Pulse 82   Temp 98.9 °F (37.2 °C) (Temporal)   Ht 167.6 cm (66\")   Wt 106 kg (234 lb 3.2 oz)   SpO2 100%   BMI 37.80 kg/m²     Body mass index is 37.8 kg/m².    Physical Exam   Cardiovascular: Normal rate.     Pulmonary/Chest  Effort normal.     Abdomen:  Janet de lis Incision healing, expected swelling, no erythema, dark serosanguienous drainage in drains , suture intact over millie umbilicus and previous dehiscence above it.  Small 0.5 mm scabbed area above sutures      Result Review :            Assessment and Plan      Diagnoses and all orders for this visit:    1. Postoperative follow-up (Primary)    Other orders  -     promethazine (PHENERGAN) 25 MG tablet; Take 1 tablet by mouth Every 6 (Six) Hours As Needed for Nausea or Vomiting.  Dispense: 10 tablet; Refill: 0        Plan:  Right drain pulled today, Left drain pull on Monday in [place] if ready, may need sutures removed next week, wear compression, rtc 1 week           Follow Up     Return for Monday Post Op Drain Pull [place].    Patient was given instructions and counseling regarding her condition. Please see specific information pulled into the AVS if appropriate. "     Larissa Valdovinos, APRN  06/08/2022

## 2022-06-09 ENCOUNTER — PATIENT ROUNDING (BHMG ONLY) (OUTPATIENT)
Dept: GYNECOLOGIC ONCOLOGY | Facility: CLINIC | Age: 47
End: 2022-06-09

## 2022-06-09 ENCOUNTER — TELEPHONE (OUTPATIENT)
Dept: PLASTIC SURGERY | Facility: CLINIC | Age: 47
End: 2022-06-09

## 2022-06-09 NOTE — TELEPHONE ENCOUNTER
Patient notes drain was pulled yesterday and still has one drain. Today noted dark serosanguineous fluid coming from umbilicus, same color fluid that is in the drains. Sent picture of area on My Chart.     Keep gauze over umbilicus, after drain is removed fluid will sometimes find the path of least resistance to come out. Will continue to monitor and will call for any complications. RTC Monday for drain removal if drainage has decreased.

## 2022-06-09 NOTE — PROGRESS NOTES
A My-Chart message has been sent to the patient for PATIENT ROUNDING with [employer] [facility] [employer]

## 2022-06-13 ENCOUNTER — TELEPHONE (OUTPATIENT)
Dept: PLASTIC SURGERY | Facility: CLINIC | Age: 47
End: 2022-06-13

## 2022-06-13 NOTE — TELEPHONE ENCOUNTER
Notes drain put out 75 ml last night. Has had reaction to tape with some blisters. Drainage is clear with with no pus. Have felt light headed a few times.     Instructed to increase fluid and increase protein. Monitor drng output. Will call office for any further symptoms. Stop using tape and use binder to hold dressing on. Can see on Friday to pull drain if volume decreases.

## 2022-06-15 NOTE — PROGRESS NOTES
"Chief Complaint  Post-op Follow-up (1m post op/suture removal )    Subjective          History of Present Illness  Mary Del Real is a 47 y.o. female who presents to [facility] for Postoperative Follow-Up of Panniculectomy and Umbilical Transposition    She is 1m post op and is here for her sutures to be removed and drain pull. Her drain has been 35-40 all weekend      Allergies: Patient has no known allergies.  Allergies Reconciled.    Review of Systems   All review of system has been reviewed and it  is negative except the ones note above    Objective     /80 (BP Location: Right arm)   Pulse 95   Temp 98.2 °F (36.8 °C) (Temporal)   Ht 167.6 cm (66\")   Wt 106 kg (233 lb 4.8 oz)   SpO2 99%   BMI 37.66 kg/m²     Body mass index is 37.66 kg/m².    Physical Exam   Cardiovascular: Normal rate.     Pulmonary/Chest  Effort normal.     Abdomen:  Janet de lis Incision healing, expected swelling, no erythema, dark serosanguienous drainage in drains , suture removed.       Result Review :            Assessment and Plan      Diagnoses and all orders for this visit:    1. Abdominal pannus (Primary)    2. Postoperative follow-up        Plan:  I will keep drain since it appears to still have high output. I removed the remaining of the stitches.   I appreciate gyn onc consultation. I talked to patient about the importance of the follow up for her ovarian/uterine tumor. In my opinion, that should be removed.            Follow Up     No follow-ups on file.    Patient was given instructions and counseling regarding her condition. Please see specific information pulled into the AVS if appropriate.     Sterling Owens MD  06/20/2022  "

## 2022-06-16 ENCOUNTER — TELEPHONE (OUTPATIENT)
Dept: PLASTIC SURGERY | Facility: CLINIC | Age: 47
End: 2022-06-16

## 2022-06-16 NOTE — TELEPHONE ENCOUNTER
Discussed patients recent rash. She is taking prednisone from pcp for rash from tape. Feels much better and drainage has improved.

## 2022-06-20 ENCOUNTER — OFFICE VISIT (OUTPATIENT)
Dept: PLASTIC SURGERY | Facility: CLINIC | Age: 47
End: 2022-06-20

## 2022-06-20 VITALS
DIASTOLIC BLOOD PRESSURE: 80 MMHG | HEART RATE: 95 BPM | HEIGHT: 66 IN | OXYGEN SATURATION: 99 % | WEIGHT: 233.3 LBS | BODY MASS INDEX: 37.49 KG/M2 | SYSTOLIC BLOOD PRESSURE: 124 MMHG | TEMPERATURE: 98.2 F

## 2022-06-20 DIAGNOSIS — E65 ABDOMINAL PANNUS: Primary | ICD-10-CM

## 2022-06-20 DIAGNOSIS — Z09 POSTOPERATIVE FOLLOW-UP: ICD-10-CM

## 2022-06-20 PROCEDURE — 99024 POSTOP FOLLOW-UP VISIT: CPT | Performed by: SURGERY

## 2022-06-20 RX ORDER — TRIAMCINOLONE ACETONIDE 1 MG/G
CREAM TOPICAL
COMMUNITY
Start: 2022-06-14 | End: 2022-10-03

## 2022-06-20 RX ORDER — HYDROXYZINE 50 MG/1
TABLET, FILM COATED ORAL
COMMUNITY
Start: 2022-06-14 | End: 2022-10-03

## 2022-06-22 ENCOUNTER — TELEPHONE (OUTPATIENT)
Dept: PLASTIC SURGERY | Facility: CLINIC | Age: 47
End: 2022-06-22

## 2022-06-22 NOTE — TELEPHONE ENCOUNTER
Spoke to the patient regarding message she sent via [employer].  She has a small open area in the pelvic area close to her left drain.  Looks like a stitch.  I offered an appointment to have it looked at but patient wants to monitor and let us know.  Stated that she is not opposed to removing stitch herself.

## 2022-07-21 ENCOUNTER — TELEPHONE (OUTPATIENT)
Dept: PLASTIC SURGERY | Facility: CLINIC | Age: 47
End: 2022-07-21

## 2022-07-21 NOTE — TELEPHONE ENCOUNTER
Pt called to alert us of a [employer] message she sent to Larissa Valdovinos N.P. this A.M. I told her we would have an MA look at it and give her a call back.

## 2022-07-21 NOTE — TELEPHONE ENCOUNTER
Returned patients call.  Instructed to continue with current treatment of packing wound until instructed by provider to stop.  She can take breathers or breaks from her binder while at rest.  If she is going to be up moving around much she needs to wear her binder until instructed otherwise by provider.  Recommended that she call at beginning of next week and update providers and see if they recommend anything different.

## 2022-10-03 ENCOUNTER — OFFICE VISIT (OUTPATIENT)
Dept: PLASTIC SURGERY | Facility: CLINIC | Age: 47
End: 2022-10-03

## 2022-10-03 VITALS
TEMPERATURE: 98.4 F | HEIGHT: 66 IN | SYSTOLIC BLOOD PRESSURE: 124 MMHG | DIASTOLIC BLOOD PRESSURE: 82 MMHG | BODY MASS INDEX: 37.48 KG/M2 | WEIGHT: 233.2 LBS | OXYGEN SATURATION: 99 % | HEART RATE: 79 BPM

## 2022-10-03 DIAGNOSIS — Z09 POSTOPERATIVE FOLLOW-UP: ICD-10-CM

## 2022-10-03 DIAGNOSIS — E65 ABDOMINAL PANNUS: Primary | ICD-10-CM

## 2022-10-03 PROCEDURE — 99212 OFFICE O/P EST SF 10 MIN: CPT | Performed by: SURGERY

## 2023-04-11 ENCOUNTER — TELEPHONE (OUTPATIENT)
Dept: PLASTIC SURGERY | Facility: CLINIC | Age: 48
End: 2023-04-11
Payer: COMMERCIAL

## 2023-05-01 ENCOUNTER — TELEPHONE (OUTPATIENT)
Dept: PLASTIC SURGERY | Facility: CLINIC | Age: 48
End: 2023-05-01
Payer: COMMERCIAL

## 2023-05-10 ENCOUNTER — OFFICE VISIT (OUTPATIENT)
Dept: PLASTIC SURGERY | Facility: CLINIC | Age: 48
End: 2023-05-10
Payer: COMMERCIAL

## 2023-05-10 VITALS
HEART RATE: 74 BPM | TEMPERATURE: 97.5 F | BODY MASS INDEX: 37.9 KG/M2 | SYSTOLIC BLOOD PRESSURE: 121 MMHG | DIASTOLIC BLOOD PRESSURE: 72 MMHG | WEIGHT: 235.8 LBS | HEIGHT: 66 IN | OXYGEN SATURATION: 99 %

## 2023-05-10 DIAGNOSIS — E65 ABDOMINAL PANNUS: Primary | ICD-10-CM

## 2023-05-10 DIAGNOSIS — Z09 POSTOPERATIVE FOLLOW-UP: ICD-10-CM

## 2023-05-10 RX ORDER — SYRINGE WITH NEEDLE, 1 ML 25GX5/8"
SYRINGE, EMPTY DISPOSABLE MISCELLANEOUS
COMMUNITY
Start: 2023-04-12

## 2023-05-10 NOTE — PROGRESS NOTES
"Consult (Discuss skin removal on arms)            History of Present Illness  Mary Del Real is a 48 y.o. female who presents to [facility] as a consult from self to discuss skin removal on arms.    Pt states she would like to discuss having excessive skin from arms.    Pt would like to also discuss doing panniculectomy revision at the same time doing a hysterectomy w/ in [place].  Pt had previous panniculectomy 5/24/22.    Subjective       Patient has no known allergies.  Allergies Reconciled.    Review of Systems    All review of system has been reviewed and it  is negative except the ones note above.     Objective     /72 (BP Location: Left arm, Patient Position: Sitting, Cuff Size: Adult)   Pulse 74   Temp 97.5 °F (36.4 °C) (Temporal)   Ht 167.6 cm (65.98\")   Wt 107 kg (235 lb 12.8 oz)   SpO2 99%   BMI 38.08 kg/m²     Body mass index is 38.08 kg/m².    Physical Exam  Incisions c, d,i  Excess of skin and fat on both arms.   Result Review :       Procedures         Assessment and Plan      Diagnoses and all orders for this visit:    1. Abdominal pannus (Primary)    2. Postoperative follow-up        Plan:  Patient promised me she will go see Dr Alexander because I would like patient to have her gyn tumor excised. Until then, I won't perform any surgical procedure. In regards to her arms, she is not a good candidate due to her high BMI.         Follow Up     Return if symptoms worsen or fail to improve.    Patient was given instructions and counseling regarding her condition. Please see specific information pulled into the AVS if appropriate.     Sterling Owens MD  05/10/2023  "

## 2023-05-11 ENCOUNTER — TELEPHONE (OUTPATIENT)
Dept: GYNECOLOGIC ONCOLOGY | Facility: CLINIC | Age: 48
End: 2023-05-11
Payer: COMMERCIAL

## 2023-05-11 NOTE — TELEPHONE ENCOUNTER
Caller: Mary Del Real    Relationship: Self    Best call back number: [phone]    What is the best time to reach you: ANY    Who are you requesting to speak with (clinical staff, provider,  specific staff member): CLINICAL    What was the call regarding: MARY IS WANTING TO DO A FOLLOW UP TO DISCUSS A HYSTEROTOMY DUE TO AN ENLARGED UTERUS       Do you require a callback: YES

## 2023-05-12 NOTE — TELEPHONE ENCOUNTER
Left message for patient to call our office.    Patient wanting to schedule appointment to discuss surgery.

## 2023-06-08 ENCOUNTER — TELEPHONE (OUTPATIENT)
Dept: GYNECOLOGIC ONCOLOGY | Facility: CLINIC | Age: 48
End: 2023-06-08
Payer: COMMERCIAL

## 2023-06-08 NOTE — TELEPHONE ENCOUNTER
Called patient to verify information for upcoming appointment. No answer. LVM for patient to return call to office.

## 2023-07-31 ENCOUNTER — TELEPHONE (OUTPATIENT)
Dept: GYNECOLOGIC ONCOLOGY | Facility: CLINIC | Age: 48
End: 2023-07-31
Payer: COMMERCIAL

## 2023-07-31 ENCOUNTER — HOSPITAL ENCOUNTER (OUTPATIENT)
Dept: CT IMAGING | Facility: HOSPITAL | Age: 48
Discharge: HOME | End: 2023-07-31
Admitting: PHYSICIAN ASSISTANT
Payer: COMMERCIAL

## 2023-07-31 DIAGNOSIS — N83.8 OVARIAN MASS: ICD-10-CM

## 2023-07-31 PROCEDURE — 74177 CT ABD & PELVIS W/CONTRAST: CPT

## 2023-07-31 PROCEDURE — 25510000001 IOPAMIDOL 61 % SOLUTION: Performed by: PHYSICIAN ASSISTANT

## 2023-07-31 RX ADMIN — IOPAMIDOL 100 ML: 612 INJECTION, SOLUTION INTRAVENOUS at 16:00

## 2023-08-04 ENCOUNTER — OFFICE VISIT (OUTPATIENT)
Dept: GYNECOLOGIC ONCOLOGY | Facility: CLINIC | Age: 48
End: 2023-08-04
Payer: COMMERCIAL

## 2023-08-04 ENCOUNTER — PREP FOR SURGERY (OUTPATIENT)
Dept: OTHER | Facility: HOSPITAL | Age: 48
End: 2023-08-04
Payer: COMMERCIAL

## 2023-08-04 ENCOUNTER — LAB (OUTPATIENT)
Dept: LAB | Facility: HOSPITAL | Age: 48
End: 2023-08-04
Payer: COMMERCIAL

## 2023-08-04 VITALS
HEART RATE: 91 BPM | SYSTOLIC BLOOD PRESSURE: 129 MMHG | HEIGHT: 66 IN | OXYGEN SATURATION: 98 % | TEMPERATURE: 98.6 F | WEIGHT: 241 LBS | DIASTOLIC BLOOD PRESSURE: 70 MMHG | BODY MASS INDEX: 38.73 KG/M2 | RESPIRATION RATE: 12 BRPM

## 2023-08-04 DIAGNOSIS — N83.8 OVARIAN MASS: Primary | ICD-10-CM

## 2023-08-04 DIAGNOSIS — N83.8 OVARIAN MASS: ICD-10-CM

## 2023-08-04 DIAGNOSIS — R19.00 PELVIC MASS: ICD-10-CM

## 2023-08-04 DIAGNOSIS — R19.00 PELVIC MASS: Primary | ICD-10-CM

## 2023-08-04 LAB
B-HCG UR QL: NEGATIVE
FSH SERPL-ACNC: 66.2 MIU/ML

## 2023-08-04 PROCEDURE — 83001 ASSAY OF GONADOTROPIN (FSH): CPT | Performed by: OBSTETRICS & GYNECOLOGY

## 2023-08-04 PROCEDURE — 36415 COLL VENOUS BLD VENIPUNCTURE: CPT

## 2023-08-04 PROCEDURE — 81025 URINE PREGNANCY TEST: CPT | Performed by: OBSTETRICS & GYNECOLOGY

## 2023-08-04 RX ORDER — SODIUM CHLORIDE, SODIUM LACTATE, POTASSIUM CHLORIDE, CALCIUM CHLORIDE 600; 310; 30; 20 MG/100ML; MG/100ML; MG/100ML; MG/100ML
100 INJECTION, SOLUTION INTRAVENOUS CONTINUOUS
OUTPATIENT
Start: 2023-08-04

## 2023-08-04 RX ORDER — CEFAZOLIN SODIUM 2 G/100ML
2 INJECTION, SOLUTION INTRAVENOUS ONCE
OUTPATIENT
Start: 2023-08-04 | End: 2023-08-04

## 2023-08-04 RX ORDER — ERYTHROMYCIN 5 MG/G
OINTMENT OPHTHALMIC EVERY 6 HOURS
COMMUNITY
Start: 2023-03-25

## 2023-08-04 RX ORDER — SODIUM CHLORIDE 0.9 % (FLUSH) 0.9 %
10 SYRINGE (ML) INJECTION AS NEEDED
OUTPATIENT
Start: 2023-08-04

## 2023-08-04 RX ORDER — SODIUM CHLORIDE 9 MG/ML
40 INJECTION, SOLUTION INTRAVENOUS AS NEEDED
OUTPATIENT
Start: 2023-08-04

## 2023-08-04 RX ORDER — GUANFACINE 2 MG/1
1 TABLET ORAL EVERY 12 HOURS SCHEDULED
COMMUNITY
Start: 2023-07-31

## 2023-08-04 RX ORDER — ONDANSETRON 2 MG/ML
4 INJECTION INTRAMUSCULAR; INTRAVENOUS EVERY 6 HOURS PRN
OUTPATIENT
Start: 2023-08-04

## 2023-08-04 RX ORDER — SODIUM CHLORIDE 0.9 % (FLUSH) 0.9 %
10 SYRINGE (ML) INJECTION EVERY 12 HOURS SCHEDULED
OUTPATIENT
Start: 2023-08-04

## 2023-08-07 ENCOUNTER — TELEPHONE (OUTPATIENT)
Dept: GYNECOLOGIC ONCOLOGY | Facility: CLINIC | Age: 48
End: 2023-08-07
Payer: COMMERCIAL

## 2023-08-07 PROBLEM — N83.8 OVARIAN MASS: Status: ACTIVE | Noted: 2023-08-07

## 2023-08-07 NOTE — TELEPHONE ENCOUNTER
LVM for Patient to call office back in regards to medication. Provider does not want to prescribe hormones at this time, until after surgery.

## 2023-08-07 NOTE — TELEPHONE ENCOUNTER
Spoke to patient to review lab results. Patient wanted to know if there was anything that could be prescribed to help with hot flashes and sleep. Let patient know that I would send a message to the provider. Patient stated verbal understanding.

## 2023-08-07 NOTE — TELEPHONE ENCOUNTER
Caller: Mary Del Real    Relationship to patient: Self    Best call back number: [phone]    Patient is needing: TO CHECK ON LAB RESULTS.

## 2023-08-08 ENCOUNTER — TELEPHONE (OUTPATIENT)
Dept: GYNECOLOGIC ONCOLOGY | Facility: CLINIC | Age: 48
End: 2023-08-08
Payer: COMMERCIAL

## 2023-08-08 NOTE — TELEPHONE ENCOUNTER
"Called to inform pt of surgery details for upcoming surgery on 8/22 and was informed by pt that she is unable to take off work this month and is unavailable next month for surgery. She was upset that we went ahead and scheduled her surgery \"so early\" and was not expecting that to be the case. Pt requested that surgery be canceled and/or pushed back to sometime in October. Informed pt that we will cancel her surgery and reschedule it for sometime in October. Pt was understanding of this. Informed Dr. Alexander and PAYumiko of this  "

## 2023-08-09 ENCOUNTER — TELEMEDICINE - AUDIO (OUTPATIENT)
Dept: OTHER | Facility: HOSPITAL | Age: 48
End: 2023-08-09
Payer: COMMERCIAL

## 2023-08-09 NOTE — PROGRESS NOTES
OUTPATIENT ONCOLOGY NUTRITION ASSESSMENT    Patient Name: Mary Del Real  YOB: 1975  MRN: [id number]  Assessment Date: 8/9/2023    COMMENTS: Spoke with pt via phone d/t no upcoming appts. Patient is struggling with appetite and PO intake d/t h/o gastric bypass and enlarged uterus that is pushing on her stomach and other organs. Pt states she feels full and her appetite is up and down d/t the fullness and lack of sleep. Pt also states she is having difficulty digesting animal protein, recently it took a few hours for a steak to digest, has been trying to follow more of a vegetarian diet. Pt states her bowels continue to move well, is increasing her water intake. Pt does utilize premier protein shakes to ensure she is getting enough protein. Pt states her current biggest struggle is not getting enough sleep, her hot flashes wake her up throughout the night--is going to try melatonin and talk to her PCP for other suggestions.     Encouraged small, frequent meals and for pt to try to get some sort of protein each time she eats. Pt states she has been doing eggs, peanut butter, and hummus. Encouraged pt to increase her consumption of the premier protein drinks to maintain her nutritional status. Discussed the importance of adequate nutrition prior to surgery: pt is wanting to get through surgery successfully. Reviewed some recommendations for after surgery; soft foods, continue protein shakes, soups, casseroles etc. Pt states if she wants more assistance after surgery she will call.         Reason for Assessment New assessment, Malnutrition Screening Tool Trigger, Education      Diagnosis/Problem   R mid abdominal mass-ovarian origin   Treatment Plan Surgery; plan for ex lap, MONIQUE, b/l salpingoophorectomy      Encounter Information        Nutrition/Diet History:  Patient appetite is up and down d/t enlarged uterus: pushing on her stomach and bowels    Oral Nutrition Supplements: Premier protein shakes   "  Factors/Symptoms Affecting Intake: Anorexia, Early satiety, bloating, fullness   Comments:      Medical/Surgical History Past Medical History:   Diagnosis Date    Anemia     Anemia     Anxiety 20 years    GERD (gastroesophageal reflux disease)     Hypertension     resolved since weight loss    Seasonal allergies      Past Surgical History:   Procedure Laterality Date    FRACTURE SURGERY      arm surgery    GALLBLADDER SURGERY  2001    GASTRIC BYPASS  2020    GASTRIC SLEEVE LAPAROSCOPIC  2016    PANNICULECTOMY N/A 5/24/2022    Procedure: PANNICULECTOMY WITH UMBILICAL TRANSPOSITION AND USE OF SPY;  Surgeon: Sterling Owens MD;  [place] [facility] [place] [facility] [place]  Service: Plastics;  Laterality: N/A;        Anthropometrics        Current Height Ht Readings from Last 1 Encounters:   08/04/23 167.6 cm (66\")      Current Weight Wt Readings from Last 1 Encounters:   08/04/23 109 kg (241 lb)      BMI  38.9   Ideal Body Weight (IBW) 130#   Usual Body Weight (UBW) ~235#   Weight Change/Trend Stable, Gain   Weight History Wt Readings from Last 30 Encounters:   08/04/23 0913 109 kg (241 lb)   05/10/23 1506 107 kg (235 lb 12.8 oz)   10/03/22 0924 106 kg (233 lb 3.2 oz)   06/20/22 0820 106 kg (233 lb 4.8 oz)   06/08/22 1533 106 kg (234 lb 3.2 oz)   06/08/22 1111 105 kg (232 lb 3.2 oz)   05/24/22 1224 109 kg (239 lb 3.2 oz)   05/06/22 1420 111 kg (244 lb)   12/03/21 0839 115 kg (254 lb)          Medications           Current medications: Biotin, Calcium-Phosphorus-Vitamin D, Cyanocobalamin, Syringe/Needle (Disp), albuterol sulfate HFA, ascorbic acid, busPIRone, erythromycin, ferrous sulfate, fluticasone, guanFACINE, nystatin, and sertraline     Tests/Procedures        Tests/Procedures No new tests/procedures     Labs       Pertinent Labs          Invalid input(s): ELISABETH HENNESSY          No results found for: COVID19  No results found for: HGBA1C       Estimated/Assessed Needs        Energy Requirements    Height for Calculation   " "66\"   Weight for Calculation 109 kg   Method for Estimation  20 kcal/kg   EST Needs (kcal/day) 2180 kcal/day       Protein Requirements    Weight for Calculation 59.1 kg (IBW)   EST Protein Needs (g/kg) 1.5 - 2.0 gm/kg   EST Daily Needs (g/day)  g/day       Fluid Requirements     Method for Estimation 1 mL/kcal    Estimated Needs (mL/day) 2200 mL/day           PES STATEMENT / NUTRITION DIAGNOSIS      Nutrition Dx Problem Problem:    Altered GI Function, Inadequate Oral Intake, Inadequate Protein Energy Intake, and Increased Nutrient Needs    Etiology:  Medical diagnosis: Other  Factors affecting nutrition: Appetite, Condition, Reported GI Symptoms    Signs/Symptoms:  PO intake and Report/Observation    Comment:      NUTRITION INTERVENTION / PLAN OF CARE      Intervention Goal(s) Maintain nutrition status, Provide information, Meet estimated needs, Disease management/therapy, Tolerate PO , Maintain intake, Maintain weight, and No significant weight loss         RD Intervention/Action Encouraged intake, Follow Tx progress         Recommendations:       PO Diet Small, frequent meals, try to eat a source of protein at each meal time       Supplements Continue premier protein shakes and continue them after surgery       Snacks       Other          Monitor/Evaluation PO intake, Supplement intake, Weight   Education Education provided   --    RD to follow per protocol.    Electronically signed by:  Joyce Jeffries RD  08/09/23 11:18 EDT    "

## 2023-08-14 ENCOUNTER — TELEPHONE (OUTPATIENT)
Dept: GYNECOLOGIC ONCOLOGY | Facility: CLINIC | Age: 48
End: 2023-08-14

## 2023-08-14 ENCOUNTER — TELEPHONE (OUTPATIENT)
Dept: GYNECOLOGIC ONCOLOGY | Facility: CLINIC | Age: 48
End: 2023-08-14
Payer: COMMERCIAL

## 2023-08-14 NOTE — TELEPHONE ENCOUNTER
Attempted to call pt to provide surgery details    PAT: 10/3 @ 9:30am    Prior office visit with Taki: 10/4 @ 1:30pm    Surgery: 10/10 arrival at 5:30am    Post-op: 10/18 @ 9:15am      Left  with callback [phone]

## 2023-08-14 NOTE — TELEPHONE ENCOUNTER
Caller: Mary Del Real    Relationship: Self    Best call back number: [phone]    What is the best time to reach you: ASAP    Who are you requesting to speak with (clinical staff, provider,  specific staff member): CLINICAL    What was the call regarding: PT REQUESTING A C/B TO GO OVER SURGERY DETAILS MORE INDEPTH, PLEASE CALL BACK TO ADVISE    Is it okay if the provider responds through MyChart: YES

## 2023-08-22 ENCOUNTER — TELEPHONE (OUTPATIENT)
Dept: GYNECOLOGIC ONCOLOGY | Facility: CLINIC | Age: 48
End: 2023-08-22

## 2023-08-22 NOTE — TELEPHONE ENCOUNTER
Caller: Mary Del Real    Relationship: Self    Best call back number: [phone]    What is the best time to reach you: ANY.LEAVE     Who are you requesting to speak with (clinical staff, provider,  specific staff member): DR MOLINA        What was the call regarding: PATIENT WAS CALLING DR HUBER BACK FROM A MISSED CALL.        Is it okay if the provider responds through MyChart: NO

## 2023-08-24 ENCOUNTER — TELEPHONE (OUTPATIENT)
Dept: PLASTIC SURGERY | Facility: CLINIC | Age: 48
End: 2023-08-24
Payer: COMMERCIAL

## 2023-08-24 ENCOUNTER — TELEPHONE (OUTPATIENT)
Dept: GYNECOLOGIC ONCOLOGY | Facility: CLINIC | Age: 48
End: 2023-08-24
Payer: COMMERCIAL

## 2023-08-24 NOTE — TELEPHONE ENCOUNTER
Caller: Mary Del Real    Relationship: Self    Best call back number: [phone]    What is the best time to reach you: ANYTIME     Who are you requesting to speak with (clinical staff, provider,  specific staff member): LYNETTE HERNANDEZ        What was the call regarding:     SCHEDULED FOR SURGERY 10/10,     DR MOLINA HAD STATED SHE WOULD COORDINATE HER SURGERY DR VALDIVIA  FOR HER SURGERY    HAD SPOKE WITH JACQUELINE EARLIER TODAY AND WAS ADVISED TO SEE IF DR VALDIVIA WAS AVAILABLE 10/10 AND SHE IS NOT AVAILABLE. THAT DAY WILL BE IN [facility]     AND WHEN LEFT FOR APPOINTMENT 08/04 DR MOLINA SAID SHE WOULD TEXT DR BALBINA VALDIVIA  AND GET THIS COORDINATED      WOULD LIKE IF DR MOLINA AND SURGERY SCHEDULER WOULD GET THIS WORKED OUT  SO THIS IS A COORDINATED CASE AS THIS WHAT WAS PRESENTED WHEN DISCUSSED ON 08/04    AND WOULD LIKE TO GET THIS CORRECTED SO CAN PLAN FOR WORK ALSO     PLEASE CALL TO DISCUSS SO IS AWARE OF PLAN GOING FORWARD.     Is it okay if the provider responds through [employer]: NO WOULD PREFER A CALL.

## 2023-08-24 NOTE — TELEPHONE ENCOUNTER
SPOKE WITH PATIENT ABOUT WANTING TO DO SURGERY ON 10/10/23 TO DO A COMBO WITH DR. MOLINA.    TOLD PATIENT WE WOULD HAVE TO TALK TO DR. VALDIVIA ABOUT SURGERY PLAN AND GET BACK WITH HER.  ALSO TOLD THE PATIENT WE CAN NOT DO SURGERY THAT DAY BECAUSE DR. VALDIVIA IS ALREADY FULL AND IN [place] DOING SURGERY.    PATIENT STATED SHE WOULD CHECK BACK IF SHE DID NOT HEAR FROM US.

## 2023-08-25 ENCOUNTER — TELEPHONE (OUTPATIENT)
Dept: PLASTIC SURGERY | Facility: CLINIC | Age: 48
End: 2023-08-25
Payer: COMMERCIAL

## 2023-08-25 ENCOUNTER — TELEPHONE (OUTPATIENT)
Dept: GYNECOLOGIC ONCOLOGY | Facility: CLINIC | Age: 48
End: 2023-08-25
Payer: COMMERCIAL

## 2023-08-25 NOTE — TELEPHONE ENCOUNTER
"Spoke to pt regarding coordinating surgery with Dr. Owens.  Pt under the impression she is to undergo abdominal revision with Dr. Owens.  Last note by Dr. Owens from appointment on 5/10/23 states she \"won't perform any surgical procedure\" until pt evaluated by our office.  Dr. Alexander reached out to Dr. Owens after her initial evaluation in our office on 8/4/23.  We are under the impression that Dr. Owens has no plans for surgery at this time, thus there is no surgery to coordinate.  Pt adamant  this is not the case.  She states Dr. Owens was agreeable to abdominal revision at the time of gynecologic surgery.     Just as our SANDRA Stout suggested yesterday, advised pt to reach out to Dr. Owens's office to confirm that Dr. Owens is performing an abdominal operation on pt.  Since there has been so much confusion, I recommended either in person or televisit with Dr. Owens to discuss her case.  Once pt has made a plan with Dr. Owens we will either proceed with exploratory laparotomy, MONIQUE, BSO as scheduled on 10/10/23 or coordinate another date with Dr. Owens as needed.      Advised pt to inform us of any changes to plan for surgery on 10/10.  Pt voiced understanding and agreement.  All questions answered at this time.              Yumiko Ingram PA-C  8/25/2023    Gynecologic Oncology   [address] [place] [address]  [phone] office            "

## 2023-08-25 NOTE — TELEPHONE ENCOUNTER
Pt called regarding surgery combo in Oct. I told pt we have a message in to Dr Owens and will let her know.

## 2023-08-25 NOTE — TELEPHONE ENCOUNTER
Caller: Mary Del Real    Relationship: Self    Best call back number: [phone]    What is the best time to reach you: ANYTIME    Who are you requesting to speak with (clinical staff, provider,  specific staff member): CLINICAL     What was the call regarding: PATIENT CALLING BACK REGARDING HER SURGERY ON 10/10/2023 MAKING SURE IT COORDINATING WITH DR. VALDIVIA. PLEASE CALL HER BACK TO DISCUSS     Is it okay if the provider responds through MyChart: NO

## 2023-08-28 ENCOUNTER — TELEPHONE (OUTPATIENT)
Dept: PLASTIC SURGERY | Facility: CLINIC | Age: 48
End: 2023-08-28
Payer: COMMERCIAL

## 2023-08-28 NOTE — TELEPHONE ENCOUNTER
Pt called asking about surgery combo with [facility]. I explained to pt that Dr Owens said she talked to Dr Willoughby and they both agreed that Dr Owens was not going to do surgery on pt.

## 2023-09-07 ENCOUNTER — TELEPHONE (OUTPATIENT)
Dept: GYNECOLOGIC ONCOLOGY | Facility: CLINIC | Age: 48
End: 2023-09-07
Payer: COMMERCIAL

## 2023-09-07 DIAGNOSIS — N83.8 OVARIAN MASS: Primary | ICD-10-CM

## 2023-09-07 NOTE — TELEPHONE ENCOUNTER
Left message for patient to call our office.     We are calling to cancel her upcoming surgery on 10/10 due to Dr. Alexander no longer being with [facility] as of 9/1/23. We will offered the following options- [facility] or [facility].

## 2023-09-08 NOTE — TELEPHONE ENCOUNTER
Patient called back and requested to transfer care to [place]. Let patient know that a referral would be placed. Patient stated verbal understanding.

## 2025-02-27 ENCOUNTER — TELEPHONE (OUTPATIENT)
Dept: PLASTIC SURGERY | Facility: CLINIC | Age: 50
End: 2025-02-27
Payer: COMMERCIAL

## (undated) DEVICE — STPLR SKIN SUBCUTICULAR INSORB 2030

## (undated) DEVICE — SLV SCD KN/LEN ADJ EXPRSS BLENDED MD 1P/U

## (undated) DEVICE — PROXIMATE RH ROTATING HEAD SKIN STAPLERS (35 WIDE) CONTAINS 35 STAINLESS STEEL STAPLES: Brand: PROXIMATE

## (undated) DEVICE — NON-WOVEN ADHESIVE WOUND DRESSING: Brand: PRIMAPORE ADHESIVE DRESSING 10*8CM

## (undated) DEVICE — SUT VIC 3/0 SH 27IN J416H

## (undated) DEVICE — ABDOMINAL BINDER, ILIO BYPASS: Brand: DEROYAL

## (undated) DEVICE — KT DRP SPY/PHI W/ICG 25MG STRL

## (undated) DEVICE — PENCL E/S SMOKEEVAC W/TELESCP CANN

## (undated) DEVICE — GOWN,REINFORCE,POLY,SIRUS,BREATH SLV,XLG: Brand: MEDLINE

## (undated) DEVICE — PLASTIC MAJOR-LF: Brand: MEDLINE INDUSTRIES, INC.

## (undated) DEVICE — STERILE POLYISOPRENE POWDER-FREE SURGICAL GLOVES: Brand: PROTEXIS

## (undated) DEVICE — Device

## (undated) DEVICE — SUT ETHLN 3-0 FS118IN 663H

## (undated) DEVICE — MARKR SKIN W/RULR AND LBL

## (undated) DEVICE — DRSNG PAD ABD 8X10IN STRL

## (undated) DEVICE — INTENDED FOR TISSUE SEPARATION, AND OTHER PROCEDURES THAT REQUIRE A SHARP SURGICAL BLADE TO PUNCTURE OR CUT.: Brand: BARD-PARKER ® CARBON RIB-BACK BLADES

## (undated) DEVICE — GLV SURG SENSICARE SLT PF LF 5.5 STRL

## (undated) DEVICE — SUT ETHLN 4/0 FS2 18IN 662G

## (undated) DEVICE — JACKSON-PRATT 100CC BULB RESERVOIR: Brand: CARDINAL HEALTH